# Patient Record
Sex: MALE | Race: WHITE | NOT HISPANIC OR LATINO | Employment: OTHER | URBAN - METROPOLITAN AREA
[De-identification: names, ages, dates, MRNs, and addresses within clinical notes are randomized per-mention and may not be internally consistent; named-entity substitution may affect disease eponyms.]

---

## 2023-11-02 ENCOUNTER — APPOINTMENT (EMERGENCY)
Dept: CT IMAGING | Facility: HOSPITAL | Age: 68
End: 2023-11-02
Payer: MEDICARE

## 2023-11-02 ENCOUNTER — HOSPITAL ENCOUNTER (EMERGENCY)
Facility: HOSPITAL | Age: 68
Discharge: HOME | End: 2023-11-02
Attending: EMERGENCY MEDICINE
Payer: MEDICARE

## 2023-11-02 ENCOUNTER — APPOINTMENT (EMERGENCY)
Dept: RADIOLOGY | Facility: HOSPITAL | Age: 68
End: 2023-11-02
Payer: MEDICARE

## 2023-11-02 VITALS
TEMPERATURE: 97.6 F | HEART RATE: 71 BPM | RESPIRATION RATE: 18 BRPM | DIASTOLIC BLOOD PRESSURE: 92 MMHG | SYSTOLIC BLOOD PRESSURE: 190 MMHG | OXYGEN SATURATION: 96 %

## 2023-11-02 DIAGNOSIS — R55 SYNCOPE: Primary | ICD-10-CM

## 2023-11-02 DIAGNOSIS — R73.9 HYPERGLYCEMIA: ICD-10-CM

## 2023-11-02 LAB
2HR DELTA HS TROPONIN: 2 NG/L
ALBUMIN SERPL BCP-MCNC: 4.5 G/DL (ref 3.5–5)
ALP SERPL-CCNC: 71 U/L (ref 34–104)
ALT SERPL W P-5'-P-CCNC: 32 U/L (ref 7–52)
ANION GAP SERPL CALCULATED.3IONS-SCNC: 12 MMOL/L
APTT PPP: 19 SECONDS (ref 23–37)
AST SERPL W P-5'-P-CCNC: 38 U/L (ref 13–39)
BASOPHILS # BLD AUTO: 0.03 THOUSANDS/ÂΜL (ref 0–0.1)
BASOPHILS NFR BLD AUTO: 1 % (ref 0–1)
BETA-HYDROXYBUTYRATE: 0.6 MMOL/L
BILIRUB SERPL-MCNC: 0.68 MG/DL (ref 0.2–1)
BILIRUB UR QL STRIP: NEGATIVE
BUN SERPL-MCNC: 9 MG/DL (ref 5–25)
CALCIUM SERPL-MCNC: 10.4 MG/DL (ref 8.4–10.2)
CARDIAC TROPONIN I PNL SERPL HS: 13 NG/L
CARDIAC TROPONIN I PNL SERPL HS: 15 NG/L
CHLORIDE SERPL-SCNC: 97 MMOL/L (ref 96–108)
CLARITY UR: CLEAR
CO2 SERPL-SCNC: 24 MMOL/L (ref 21–32)
COLOR UR: YELLOW
CREAT SERPL-MCNC: 0.86 MG/DL (ref 0.6–1.3)
EOSINOPHIL # BLD AUTO: 0.06 THOUSAND/ÂΜL (ref 0–0.61)
EOSINOPHIL NFR BLD AUTO: 1 % (ref 0–6)
ERYTHROCYTE [DISTWIDTH] IN BLOOD BY AUTOMATED COUNT: 12.4 % (ref 11.6–15.1)
ETHANOL SERPL-MCNC: <10 MG/DL
GFR SERPL CREATININE-BSD FRML MDRD: 88 ML/MIN/1.73SQ M
GLUCOSE SERPL-MCNC: 272 MG/DL (ref 65–140)
GLUCOSE SERPL-MCNC: 416 MG/DL (ref 65–140)
GLUCOSE UR STRIP-MCNC: ABNORMAL MG/DL
HCT VFR BLD AUTO: 48.4 % (ref 36.5–49.3)
HGB BLD-MCNC: 16.5 G/DL (ref 12–17)
HGB UR QL STRIP.AUTO: NEGATIVE
IMM GRANULOCYTES # BLD AUTO: 0.02 THOUSAND/UL (ref 0–0.2)
IMM GRANULOCYTES NFR BLD AUTO: 0 % (ref 0–2)
INR PPP: 0.94 (ref 0.84–1.19)
KETONES UR STRIP-MCNC: NEGATIVE MG/DL
LEUKOCYTE ESTERASE UR QL STRIP: NEGATIVE
LYMPHOCYTES # BLD AUTO: 1.14 THOUSANDS/ÂΜL (ref 0.6–4.47)
LYMPHOCYTES NFR BLD AUTO: 20 % (ref 14–44)
MAGNESIUM SERPL-MCNC: 1.8 MG/DL (ref 1.9–2.7)
MCH RBC QN AUTO: 33.2 PG (ref 26.8–34.3)
MCHC RBC AUTO-ENTMCNC: 34.1 G/DL (ref 31.4–37.4)
MCV RBC AUTO: 97 FL (ref 82–98)
MONOCYTES # BLD AUTO: 0.37 THOUSAND/ÂΜL (ref 0.17–1.22)
MONOCYTES NFR BLD AUTO: 6 % (ref 4–12)
NEUTROPHILS # BLD AUTO: 4.12 THOUSANDS/ÂΜL (ref 1.85–7.62)
NEUTS SEG NFR BLD AUTO: 72 % (ref 43–75)
NITRITE UR QL STRIP: NEGATIVE
NRBC BLD AUTO-RTO: 0 /100 WBCS
PH UR STRIP.AUTO: 5.5 [PH]
PLATELET # BLD AUTO: 187 THOUSANDS/UL (ref 149–390)
PMV BLD AUTO: 10.7 FL (ref 8.9–12.7)
POTASSIUM SERPL-SCNC: 3.9 MMOL/L (ref 3.5–5.3)
PROT SERPL-MCNC: 8 G/DL (ref 6.4–8.4)
PROT UR STRIP-MCNC: NEGATIVE MG/DL
PROTHROMBIN TIME: 12.7 SECONDS (ref 11.6–14.5)
RBC # BLD AUTO: 4.97 MILLION/UL (ref 3.88–5.62)
SODIUM SERPL-SCNC: 133 MMOL/L (ref 135–147)
SP GR UR STRIP.AUTO: 1.01
UROBILINOGEN UR QL STRIP.AUTO: 0.2 E.U./DL
WBC # BLD AUTO: 5.74 THOUSAND/UL (ref 4.31–10.16)

## 2023-11-02 PROCEDURE — 81003 URINALYSIS AUTO W/O SCOPE: CPT | Performed by: EMERGENCY MEDICINE

## 2023-11-02 PROCEDURE — G1004 CDSM NDSC: HCPCS

## 2023-11-02 PROCEDURE — 99291 CRITICAL CARE FIRST HOUR: CPT | Performed by: EMERGENCY MEDICINE

## 2023-11-02 PROCEDURE — 83036 HEMOGLOBIN GLYCOSYLATED A1C: CPT | Performed by: EMERGENCY MEDICINE

## 2023-11-02 PROCEDURE — 70450 CT HEAD/BRAIN W/O DYE: CPT

## 2023-11-02 PROCEDURE — 71045 X-RAY EXAM CHEST 1 VIEW: CPT

## 2023-11-02 PROCEDURE — 82010 KETONE BODYS QUAN: CPT | Performed by: EMERGENCY MEDICINE

## 2023-11-02 PROCEDURE — 84484 ASSAY OF TROPONIN QUANT: CPT | Performed by: EMERGENCY MEDICINE

## 2023-11-02 PROCEDURE — 96361 HYDRATE IV INFUSION ADD-ON: CPT

## 2023-11-02 PROCEDURE — 96374 THER/PROPH/DIAG INJ IV PUSH: CPT

## 2023-11-02 PROCEDURE — 83735 ASSAY OF MAGNESIUM: CPT | Performed by: EMERGENCY MEDICINE

## 2023-11-02 PROCEDURE — 85730 THROMBOPLASTIN TIME PARTIAL: CPT | Performed by: EMERGENCY MEDICINE

## 2023-11-02 PROCEDURE — 85025 COMPLETE CBC W/AUTO DIFF WBC: CPT | Performed by: EMERGENCY MEDICINE

## 2023-11-02 PROCEDURE — 72125 CT NECK SPINE W/O DYE: CPT

## 2023-11-02 PROCEDURE — 93005 ELECTROCARDIOGRAM TRACING: CPT

## 2023-11-02 PROCEDURE — 80053 COMPREHEN METABOLIC PANEL: CPT | Performed by: EMERGENCY MEDICINE

## 2023-11-02 PROCEDURE — 82077 ASSAY SPEC XCP UR&BREATH IA: CPT | Performed by: EMERGENCY MEDICINE

## 2023-11-02 PROCEDURE — 99284 EMERGENCY DEPT VISIT MOD MDM: CPT

## 2023-11-02 PROCEDURE — 82948 REAGENT STRIP/BLOOD GLUCOSE: CPT

## 2023-11-02 PROCEDURE — 36415 COLL VENOUS BLD VENIPUNCTURE: CPT | Performed by: EMERGENCY MEDICINE

## 2023-11-02 PROCEDURE — 85610 PROTHROMBIN TIME: CPT | Performed by: EMERGENCY MEDICINE

## 2023-11-02 RX ORDER — MORPHINE SULFATE 4 MG/ML
4 INJECTION, SOLUTION INTRAMUSCULAR; INTRAVENOUS ONCE
Status: COMPLETED | OUTPATIENT
Start: 2023-11-02 | End: 2023-11-02

## 2023-11-02 RX ADMIN — SODIUM CHLORIDE 1000 ML: 0.9 INJECTION, SOLUTION INTRAVENOUS at 16:48

## 2023-11-02 RX ADMIN — MORPHINE SULFATE 4 MG: 4 INJECTION, SOLUTION INTRAMUSCULAR; INTRAVENOUS at 19:51

## 2023-11-02 NOTE — ED PROVIDER NOTES
History  Chief Complaint   Patient presents with    Syncope     Patient reports syncopal episode while sitting on the side of the hot tub. Patient repots he went down on his back and neck. HPI      This is a very pleasant, 16-year-old gentleman, very of colon resection in 2018: no issues for preoperative clearance, hip replacement in 2017, previously on metformin stopped taking this secondary to side effects of diarrhea, has not had blood work in a significant amount of time, presents emergency department with his common-law significant other ambulance with a chief complaint of a syncopal episode today while getting out of the hot tub. Patient was in the hot tub for approximately 30 minutes was attempting to get out and had a syncopal episode with no seizure activity, no bowel or bladder incontinence. No LOC. Patient retired from [employer], pt lives approx 75 miles from here, here on short stay vacation. Patient is currently complaining of right shoulder pain, and right-sided neck pain result of the fall. Prior to having a "syncopal episode" patient denies any unexplained nausea, dizziness, pain, extremity weakness, headache or abdominal pain. Patient's significant other at the bedside reports he consumes alcohol on a regular basis, has not had any alcohol today but did have 3-4 drinks last evening. None       Past Medical History:   Diagnosis Date    Diabetes mellitus ([address])     Hypertension        History reviewed. No pertinent surgical history. History reviewed. No pertinent family history. I have reviewed and agree with the history as documented. E-Cigarette/Vaping     E-Cigarette/Vaping Substances     Social History     Tobacco Use    Smoking status: Every Day     Types: Cigarettes    Smokeless tobacco: Never   Substance Use Topics    Alcohol use:  Yes     Alcohol/week: 4.0 standard drinks of alcohol     Types: 4 Standard drinks or equivalent per week     Comment: daily    Drug use: Not Currently       Review of Systems   Constitutional: Negative. HENT: Negative. Eyes: Negative. Respiratory: Negative. Negative for cough, choking, chest tightness and shortness of breath. Cardiovascular: Negative. Negative for chest pain, palpitations and leg swelling. Gastrointestinal: Negative. Endocrine: Negative. Genitourinary: Negative. Musculoskeletal: Negative. Negative for joint swelling. Skin: Negative. Allergic/Immunologic: Negative. Neurological: Negative. Negative for dizziness, tremors, syncope, numbness and headaches. Hematological: Negative. Psychiatric/Behavioral: Negative. Physical Exam  Physical Exam  Vitals and nursing note reviewed. Constitutional:       Appearance: Normal appearance. He is normal weight. HENT:      Head: Normocephalic. Right Ear: Tympanic membrane, ear canal and external ear normal.      Left Ear: Tympanic membrane, ear canal and external ear normal.      Nose: Nose normal.      Mouth/Throat:      Mouth: Mucous membranes are moist.      Pharynx: Oropharynx is clear. Eyes:      Extraocular Movements: Extraocular movements intact. Conjunctiva/sclera: Conjunctivae normal.      Pupils: Pupils are equal, round, and reactive to light. Neck:      Vascular: No carotid bruit. Cardiovascular:      Rate and Rhythm: Normal rate and regular rhythm. Pulses: Normal pulses. Heart sounds: Normal heart sounds. Pulmonary:      Effort: Pulmonary effort is normal.      Breath sounds: Normal breath sounds. Abdominal:      General: Abdomen is flat. Bowel sounds are normal.   Musculoskeletal:         General: Normal range of motion. Cervical back: Normal range of motion. No rigidity or tenderness. Lymphadenopathy:      Cervical: No cervical adenopathy. Skin:     General: Skin is warm. Capillary Refill: Capillary refill takes less than 2 seconds. Neurological:      General: No focal deficit present. Mental Status: He is alert. Mental status is at baseline. Vital Signs  ED Triage Vitals   Temperature Pulse Respirations Blood Pressure SpO2   11/02/23 1458 11/02/23 1458 11/02/23 1458 11/02/23 1458 11/02/23 1458   97.6 °F (36.4 °C) 82 18 (!) 184/97 95 %      Temp src Heart Rate Source Patient Position - Orthostatic VS BP Location FiO2 (%)   -- 11/02/23 1900 11/02/23 1900 11/02/23 1900 --    Monitor Sitting Left arm       Pain Score       11/02/23 1951       7           Vitals:    11/02/23 1458 11/02/23 1900 11/02/23 1901 11/02/23 1930   BP: (!) 184/97 160/95 160/95 (!) 190/92   Pulse: 82 80  71   Patient Position - Orthostatic VS:  Sitting  Sitting         Visual Acuity  Visual Acuity      Flowsheet Row Most Recent Value   L Pupil Size (mm) 3   R Pupil Size (mm) 3            ED Medications  Medications   sodium chloride 0.9 % bolus 1,000 mL (0 mL Intravenous Stopped 11/2/23 1748)   morphine injection 4 mg (4 mg Intravenous Given 11/2/23 1951)       Diagnostic Studies  Results Reviewed       Procedure Component Value Units Date/Time    Fingerstick Glucose (POCT) [[id number]]  (Abnormal) Collected: 11/02/23 1945    Lab Status: Final result Updated: 11/02/23 1947     POC Glucose 272 mg/dl     Hemoglobin A1C [[id number]] Collected: 11/02/23 1528    Lab Status:  In process Specimen: Blood from Arm, Right Updated: 11/02/23 1909    HS Troponin I 2hr [[id number]]  (Normal) Collected: 11/02/23 1746    Lab Status: Final result Specimen: Blood from Arm, Right Updated: 11/02/23 1817     hs TnI 2hr 15 ng/L      Delta 2hr hsTnI 2 ng/L     UA w Reflex to Microscopic w Reflex to Culture [[id number]]  (Abnormal) Collected: 11/02/23 1749    Lab Status: Final result Specimen: Urine, Clean Catch Updated: 11/02/23 1757     Color, UA Yellow     Clarity, UA Clear     Specific Gravity, UA 1.010     pH, UA 5.5     Leukocytes, UA Negative     Nitrite, UA Negative     Protein, UA Negative mg/dl      Glucose, UA 3+ mg/dl      Ketones, UA Negative mg/dl      Urobilinogen, UA 0.2 E.U./dl      Bilirubin, UA Negative     Occult Blood, UA Negative    Beta Hydroxybutyrate [[id number]]  (Abnormal) Collected: 11/02/23 1649    Lab Status: Final result Specimen: Blood from Arm, Right Updated: 11/02/23 1654     BETA-HYDROXYBUTYRATE 0.6 mmol/L     HS Troponin 0hr (reflex protocol) [[id number]]  (Normal) Collected: 11/02/23 1528    Lab Status: Final result Specimen: Blood from Arm, Right Updated: 11/02/23 1637     hs TnI 0hr 13 ng/L     Ethanol [[id number]]  (Normal) Collected: 11/02/23 1528    Lab Status: Final result Specimen: Blood from Arm, Right Updated: 11/02/23 1633     Ethanol Lvl <10 mg/dL     Comprehensive metabolic panel [[id number]]  (Abnormal) Collected: 11/02/23 1528    Lab Status: Final result Specimen: Blood from Arm, Right Updated: 11/02/23 1632     Sodium 133 mmol/L      Potassium 3.9 mmol/L      Chloride 97 mmol/L      CO2 24 mmol/L      ANION GAP 12 mmol/L      BUN 9 mg/dL      Creatinine 0.86 mg/dL      Glucose 416 mg/dL      Calcium 10.4 mg/dL      AST 38 U/L      ALT 32 U/L      Alkaline Phosphatase 71 U/L      Total Protein 8.0 g/dL      Albumin 4.5 g/dL      Total Bilirubin 0.68 mg/dL      eGFR 88 ml/min/1.73sq m     Narrative:      [facility] guidelines for Chronic Kidney Disease (CKD):     Stage 1 with normal or high GFR (GFR > 90 mL/min/1.73 square meters)    Stage 2 Mild CKD (GFR = 60-89 mL/min/1.73 square meters)    Stage 3A Moderate CKD (GFR = 45-59 mL/min/1.73 square meters)    Stage 3B Moderate CKD (GFR = 30-44 mL/min/1.73 square meters)    Stage 4 Severe CKD (GFR = 15-29 mL/min/1.73 square meters)    Stage 5 End Stage CKD (GFR <15 mL/min/1.73 square meters)  Note: GFR calculation is accurate only with a steady state creatinine    Magnesium [[id number]]  (Abnormal) Collected: 11/02/23 1528    Lab Status: Final result Specimen: Blood from Arm, Right Updated: 11/02/23 1632     Magnesium 1.8 mg/dL     Protime-INR [[id number]]  (Normal) Collected: 11/02/23 1528    Lab Status: Final result Specimen: Blood from Arm, Right Updated: 11/02/23 1631     Protime 12.7 seconds      INR 0.94    APTT [[id number]]  (Abnormal) Collected: 11/02/23 1528    Lab Status: Final result Specimen: Blood from Arm, Right Updated: 11/02/23 1631     PTT 19 seconds     CBC and differential [[id number]] Collected: 11/02/23 1528    Lab Status: Final result Specimen: Blood from Arm, Right Updated: 11/02/23 1613     WBC 5.74 Thousand/uL      RBC 4.97 Million/uL      Hemoglobin 16.5 g/dL      Hematocrit 48.4 %      MCV 97 fL      MCH 33.2 pg      MCHC 34.1 g/dL      RDW 12.4 %      MPV 10.7 fL      Platelets 689 Thousands/uL      nRBC 0 /100 WBCs      Neutrophils Relative 72 %      Immat GRANS % 0 %      Lymphocytes Relative 20 %      Monocytes Relative 6 %      Eosinophils Relative 1 %      Basophils Relative 1 %      Neutrophils Absolute 4.12 Thousands/µL      Immature Grans Absolute 0.02 Thousand/uL      Lymphocytes Absolute 1.14 Thousands/µL      Monocytes Absolute 0.37 Thousand/µL      Eosinophils Absolute 0.06 Thousand/µL      Basophils Absolute 0.03 Thousands/µL                    CT spine cervical without contrast   Final Result by Sam Tamez MD (11/02 1616)      No acute compression collapse of the vertebra      Bridging ossification at multiple levels with spondylotic changes, evaluate for DISH      Broad-based disc and osteophyte complex at C5-6 with moderate central canal narrowing                  Workstation performed: [id number]         CT head without contrast   Final Result by Sam Tamez MD (11/02 1611)      No acute intracranial hemorrhage seen   No mass effect or midline shift seen            Workstation performed: [id number]         XR chest 1 view portable   ED Interpretation by Elvin Torres III, DO (11/02 1622)   Stat portable chest x-ray shows no acute osseous abnormality, no obvious infiltrates.   The patient may have interstitial lung disease, no occult pneumothoraces                 Procedures  ECG 12 Lead Documentation Only    Date/Time: 11/2/2023 3:56 PM    Performed by: Pierre Bee DO  Authorized by: Rocio Kaufman III, DO    Indications / Diagnosis:  Syncope  ECG reviewed by me, the ED Provider: yes    Patient location:  ED  Comments:      I personally reviewed this EKG was performed on the patient on November 2, 2023, EKG was completed at 3:54 PM and interpreted by me at 3:56 PM, sinus rhythm with marked sinus arrhythmia, nonspecific T wave finding in V2, V3, ventricular rate of 78 bpm, OH interval 200 ms, rate portion intervals within normal limits. No prior EKGs to compare to. No diffuse elevations to indicate pericarditis. No coved ST elevations greater than 2mm with negative T waves in V1-3 to indicate concern for brugada. No biphasic T waves in V2, V3 to indicate Wellens (critical stenosis of LAD). No elevation in aVR or deviation when compared to V1 (can be associated with ST depression in I,II, V4-6 when left main occlusion is present).    CriticalCare Time    Date/Time: 11/2/2023 4:44 PM    Performed by: Pierre Bee DO  Authorized by: Pierre Bee DO    Critical care provider statement:     Critical care time (minutes):  35    Critical care start time:  11/2/2023 4:44 PM    Critical care end time:  11/2/2023 5:35 PM    Critical care was necessary to treat or prevent imminent or life-threatening deterioration of the following conditions:  Circulatory failure    Critical care was time spent personally by me on the following activities:  Development of treatment plan with patient or surrogate, discussions with consultants, obtaining history from patient or surrogate, discussions with primary provider, evaluation of patient's response to treatment, review of old charts, re-evaluation of patient's condition, ordering and review of radiographic studies, ordering and review of laboratory studies and ordering and performing treatments and interventions  Comments:      Sugar was significantly high, IV fluid hydration initiated, came down to stable limit. ED Course  ED Course as of 11/03/23 0051   Thu Nov 02, 2023   1516 Patient seen and evaluated, orders placed, syncopal episode with mild head trauma fall while getting out of a hot tub, minimal no prodrome, no alcohol, no blood thinners. Brief focused differential diagnosis in this patient is as follows: Vasovagal syncope versus arrhythmia versus dilation resulting in decreased blood pressure leading to syncope cyst electrolyte abnormalities. [phone] Patient and wife updated. 1841 Second set of cardiac enzymes unremarkable. 1906 Patient's reassessed, patient is declining to stay in the hospital, reviewed patient's glucose of 416, patient's beta butyrate is marginally elevated, IV fluids are infusing. He is declined to stay in the hospital, had a long discussion with the patient about diet modification and lifestyle modification guarding his diabetes, patient is declining to be restarted on his metformin due to hx of diarrhea when on metformin previously. HEART Risk Score      Flowsheet Row Most Recent Value   Heart Score Risk Calculator    History 0 Filed at: 11/02/2023 1841   ECG 0 Filed at: 11/02/2023 1841   Age 1 Filed at: 11/02/2023 1841   Risk Factors 1 Filed at: 11/02/2023 1841   Troponin 1 Filed at: 11/02/2023 1841   HEART Score 3 Filed at: 11/02/2023 1841                          SBIRT 22yo+      Flowsheet Row Most Recent Value   Initial Alcohol Screen: US AUDIT-C     1. How often do you have a drink containing alcohol? 6 Filed at: 11/02/2023 1458   2. How many drinks containing alcohol do you have on a typical day you are drinking? 3 Filed at: 11/02/2023 1458   3a. Male UNDER 65: How often do you have five or more drinks on one occasion? 0 Filed at: 11/02/2023 1458   3b. FEMALE Any Age, or MALE 65+: How often do you have 4 or more drinks on one occassion? 0 Filed at: 11/02/2023 1458   Audit-C Score 9 Filed at: 11/02/2023 1458   Full Alcohol Screen: US AUDIT    4. How often during the last year have you found that you were not able to stop drinking once you had started? 0 Filed at: 11/02/2023 1458   5. How often during past year have you failed to do what was normally expected of you because of drinking? 0 Filed at: 11/02/2023 1458   6. How often in past year have you needed a first drink in the morning to get yourself going after a heavy drinking session? 0 Filed at: 11/02/2023 1458   7. How often in past year have you had feeling of guilt or remorse after drinking? 0 Filed at: 11/02/2023 1458   8. How often in past year have you been unable to remember what happened night before because you had been drinking? 0 Filed at: 11/02/2023 1458   9. Have you or someone else been injured as a result of your drinking? 0 Filed at: 11/02/2023 1458   10. Has a relative, friend, doctor or other health worker been concerned about your drinking and suggested you cut down?  0 Filed at: 11/02/2023 1458   AUDIT Total Score 9 Filed at: 11/02/2023 1458   ANJALI: How many times in the past year have you. .. Used an illegal drug or used a prescription medication for non-medical reasons? Never Filed at: 11/02/2023 1458                      Medical Decision Making  His blood sugar came down to 272 from 416, will have pt sign out AMA. Declining admission, reassessed multiple times. EKG shows no ST elevations or significant ST depressions concerning for acute coronary syndrome or Brugada syndrome. No evidence of AV block tachy irena syndrome. No significantly shortened [place] interval or delta wave to suggest Kristen-Parkinson-White syndrome. No significant LVH to suggest hypertrophic cardiomyopathy. QTC within normal limits and no epsilon wave to suggest arrhythmogenic right ventricular dysplasia. The history is suggestive of syncope. The patient lacks the following red flags:    Old age  FH of sudden cardiac death  Evidence of bleeding  Exertional syncope  Palpitations prior to syncope  Loud Murmur  Persistently abnormal vitals  No Abnormal ECG   CHF  Suspicion of structural heart disease   Ischemic, dysrhythmic, obstructive, valvular  HCT <30   SOB  Hypotension (SBP <90)   Associated chest pain  Doubt CO poisoning       Eldon Palencia is requesting on leaving against medical advice, despite my recommendation to remain for ongoing treatment. 1: Capacity: I have determined that the patient has capacity to make the decision to leave against medical advice based on the following: The patient is oriented to person, place and time. A. Ability to express a choice: The patient is able to express his or her choice and communicate that choice. Bria De La Paz to understand relevant information: The patient is able to verbalize their diagnosis, understand information about the purpose of treatment, remember the information, and show that he or she can be part of the decision-making process. Hussein Singh to appreciate the significance of the information and its consequences: The patient understands the consequences of treatment refusal and the risks and benefits of accepting or refusing treatment. D. Ability to manipulate information: The patient is able to engage in reasoning as it applies to making treatment decisions   2: Psychiatric Consultation: There is not an indication to call psychiatry consultation to determine capacity. There is no obvious evidence that the patient is exhibiting behavior consistent with psychosis, delusional / suicidal / homicidal thoughts or  having active hallucinations. The patient is clinically sober and does not appear to be under the influence of any illicit drugs at this time.    3. Alternative Treatment: I have discussed the recommended course of treatment and available alternatives. 4. Risks: I have discussed the specific risks of that patient refusing treatment   5. Follow-up Care: I have discussed the follow-up care and encouraged the patient to see a primary doctor ASAP. 6. ED Option: I have emphasized that the patient has the option to return to the ED at anytime and that we are always open. The patient has been advised of the risks, in layman terms, of leaving AMA which include, but are not limited to death, coma, permanent disability, loss of current lifestyle, delay in diagnosis. Alternatives have been offered - the patient remains steadfast in their wish to leave. The patient has been advised that should they change their mind they are welcome to return to this hospital, or any other, at any time. The patient understands that in no way does an [address] [place] [address] discharge mean that I do not want them to have the best medical care available. To this end, I have provided appropriate prescriptions, referrals, and discharge instructions. The patient did sign AMA paperwork. The above discussion was witnessed by another member of staff. Amount and/or Complexity of Data Reviewed  Labs: ordered. Radiology: ordered and independent interpretation performed. Risk  Prescription drug management.              Disposition  Final diagnoses:   Syncope   Hyperglycemia     Time reflects when diagnosis was documented in both MDM as applicable and the Disposition within this note       Time User Action Codes Description Comment    11/2/2023  7:00 PM Mariano Ion [R55] Syncope     11/2/2023  7:00 PM Zach Sher Add [R73.9] Hyperglycemia           ED Disposition       ED Disposition   AMA    Condition   --    Date/Time   Thu Nov 2, 2023 1946    Comment   Date: 11/2/2023  Patient: Ngozi Palmer  Admitted: 11/2/2023  2:54 PM  Attending Provider: Chito Rios or his authorized caregiver has made the decision for the patient to leave the emergency department against the  advice of the emergency department staff. He or his authorized caregiver has been informed and understands the inherent risks, including death. He or his authorized caregiver has decided to accept the responsibility for this decision. Elmer Wilkins and all necessary parties have been advised that he may return for further evaluation or treatment. His condition at time of discharge was fair. Elmer Wilkins had current vital signs as follows:  BP (!) 184/97   Pulse 82   Temp 97.6 °F (36.4 ° C)   Resp 18                Follow-up Information    None         There are no discharge medications for this patient. No discharge procedures on file.     PDMP Review       None            ED Provider  Electronically Signed by             Yissel Kay III, DO  11/03/23 [id number]

## 2023-11-02 NOTE — Clinical Note
Date: 11/2/2023  Patient: Saji Wan  Admitted: 11/2/2023  2:54 PM  Attending Provider: Oc Cao or his authorized caregiver has made the decision for the patient to leave the emergency department against the  advice of the emergency department staff. He or his authorized caregiver has been informed and understands the inherent risks, including death. He or his authorized caregiver has decided to accept the responsibility for this decision. Saji Wan and all necessary parties have been advised that he may return for further evaluation or treatment. His condition at time of discharge was fair.   Saji Wan had current vital signs as follows:  BP (!) 184/97   Pulse 82   Temp 97.6 °F (36.4 ° C)   Resp 18

## 2023-11-03 LAB
ATRIAL RATE: 78 BPM
EST. AVERAGE GLUCOSE BLD GHB EST-MCNC: 269 MG/DL
HBA1C MFR BLD: 11 %
P AXIS: 66 DEGREES
PR INTERVAL: 200 MS
QRS AXIS: -16 DEGREES
QRSD INTERVAL: 94 MS
QT INTERVAL: 410 MS
QTC INTERVAL: 467 MS
T WAVE AXIS: 76 DEGREES
VENTRICULAR RATE: 78 BPM

## 2023-11-03 PROCEDURE — 93010 ELECTROCARDIOGRAM REPORT: CPT | Performed by: INTERNAL MEDICINE
